# Patient Record
Sex: FEMALE | Race: OTHER | Employment: UNEMPLOYED | URBAN - METROPOLITAN AREA
[De-identification: names, ages, dates, MRNs, and addresses within clinical notes are randomized per-mention and may not be internally consistent; named-entity substitution may affect disease eponyms.]

---

## 2022-05-31 ENCOUNTER — OFFICE VISIT (OUTPATIENT)
Dept: PRIMARY CARE CLINIC | Age: 37
End: 2022-05-31

## 2022-05-31 VITALS
SYSTOLIC BLOOD PRESSURE: 102 MMHG | WEIGHT: 146 LBS | OXYGEN SATURATION: 97 % | TEMPERATURE: 97.8 F | HEART RATE: 72 BPM | DIASTOLIC BLOOD PRESSURE: 60 MMHG

## 2022-05-31 DIAGNOSIS — G51.0 BELL'S PALSY: Primary | ICD-10-CM

## 2022-05-31 DIAGNOSIS — Z76.89 ENCOUNTER TO ESTABLISH CARE: ICD-10-CM

## 2022-05-31 PROCEDURE — 99203 OFFICE O/P NEW LOW 30 MIN: CPT

## 2022-05-31 RX ORDER — VALACYCLOVIR HYDROCHLORIDE 1 G/1
1000 TABLET, FILM COATED ORAL 3 TIMES DAILY
Qty: 21 TABLET | Refills: 0 | Status: SHIPPED | OUTPATIENT
Start: 2022-05-31 | End: 2022-06-07

## 2022-05-31 RX ORDER — TETRAHYDROZOLINE HCL 0.05 %
1 DROPS OPHTHALMIC (EYE) 3 TIMES DAILY
Qty: 15 ML | Refills: 0 | Status: SHIPPED | OUTPATIENT
Start: 2022-05-31 | End: 2022-09-08

## 2022-05-31 RX ORDER — PREDNISONE 20 MG/1
60 TABLET ORAL DAILY
Qty: 21 TABLET | Refills: 0 | Status: SHIPPED | OUTPATIENT
Start: 2022-05-31 | End: 2022-06-07

## 2022-05-31 ASSESSMENT — ENCOUNTER SYMPTOMS
CHEST TIGHTNESS: 0
EYE PAIN: 1
WHEEZING: 0
BLOOD IN STOOL: 0
COUGH: 0
ABDOMINAL PAIN: 0
DIARRHEA: 0
CONSTIPATION: 0
VOMITING: 0
SHORTNESS OF BREATH: 0
NAUSEA: 0

## 2022-05-31 ASSESSMENT — PATIENT HEALTH QUESTIONNAIRE - PHQ9
2. FEELING DOWN, DEPRESSED OR HOPELESS: 1
SUM OF ALL RESPONSES TO PHQ QUESTIONS 1-9: 1
SUM OF ALL RESPONSES TO PHQ QUESTIONS 1-9: 1
1. LITTLE INTEREST OR PLEASURE IN DOING THINGS: 0
SUM OF ALL RESPONSES TO PHQ9 QUESTIONS 1 & 2: 1
SUM OF ALL RESPONSES TO PHQ QUESTIONS 1-9: 1
SUM OF ALL RESPONSES TO PHQ QUESTIONS 1-9: 1

## 2022-05-31 NOTE — ASSESSMENT & PLAN NOTE
Suspect Bell's Palsy. Rx glucocorticoid and antiviral x1 week. Eye drops TID and eye taping at night. F/u in 3 days if no improvement, ER if s/s worsen or develops new concerning s/s.

## 2022-05-31 NOTE — PATIENT INSTRUCTIONS
Patient Education        Parálisis facial de Harvey: Instrucciones de cuidado  Bell's Palsy: Care Instructions  Instrucciones de cuidado     La parálisis facial de Harvey es tasha parálisis o debilitamiento de los músculos de un lado de la chloe. Las personas con parálisis facial de Harvey suelen tener caído un lado de la boca y les evgeny trabajo cerrar por completo el taisha de adama mismo lado. La parálisis facial de Harvey puede interferir también con el sentido del gusto. Sleepy Eye sucede cuando se inflama un nervio de la chloe. La causa de la parálisis facial de Harvey no es un ataque cerebral. No se conoce la causa de esta inflamación del nervio. Penelope algunos expertos piensan que la causa podría ser un virus. Debido a esto, en ocasiones los médicos recetan un medicamento antiviral para tratarla. También podrían darle medicamentos para reducir lahinchazón. La parálisis facial de Harvey por lo general mejora por sí emiliana en algunassemanas o meses. La atención de seguimiento es tasha parte clave de gloria tratamiento y seguridad. Asegúrese de hacer y acudir a todas las citas, y llame a gloria médico si está teniendo problemas. También es tasha buena idea saber los [place] de susexámenes y mantener tasha lista de los medicamentos que christian. ¿Cómo puede cuidarse en el hogar?  [employer] medicamentos exactamente mich le fueron recetados. Llame a gloria médico si phu estar teniendo problemas con gloria medicamento. [employer] medicamentos específicos recetados por gloria médico.   Use lágrimas artificiales o pomada si se le secan demasiado los ojos. La parálisis facial de Harvey puede causar la caída del párpado inferior, lo que produce sequedad en el taisha.  Si no puede cerrar el taisha por completo, piense en usar un parche para dormir.  Ayúdese a parpadear usando un dedo para cerrar y abrir el párpado. Sleepy Eye podría ayudar a mantener el taisha húmedo.  Use anteojos o gafas para prevenir que el polvo y la chani entren en el taisha.    A medida que recupera la sensación en la chloe, masajee la frente, las mejillas y los labios. El masaje podría fortalecer los músculos de la chloe.  Cepíllese los dientes y use hilo dental con frecuencia para ayudar a prevenir las caries. La parálisis facial de Harvey puede secar la saliva en un lado de gloria boca. [place] aumenta el riesgo de caries. ¿Cuándo debe pedir ayuda? Llame al 911 en cualquier momento que considere que necesita atención de [place]. Por ejemplo, llame si:     Tiene síntomas de un ataque cerebral. Estos pueden incluir:  ? Entumecimiento, hormigueo, debilidad o parálisis repentinos en la chloe, el brazo o la pierna, sobre todo si ocurre en un solo lado del cuerpo. ? Cambios súbitos en la vista. ? Problemas repentinos para hablar. ? Confusión súbita o dificultad repentina para comprender frases sencillas. ? Problemas repentinos para caminar o mantener el equilibrio. ? Un dolor de dot intenso y repentino, distinto a los sacha de [place]. Llame a gloria médico ahora mismo o busque atención médica inmediata si:     Siente entumecimiento o debilidad que se expande más allá de un lado de la chloe.      Tiene un salpullido en la piel o dolor o enrojecimiento en el taisha, o le molesta la claude.      Tiene nuevo dolor de dot o [place]. Preste especial atención a los cambios en gloria chuyita y asegúrese de comunicarsecon gloria médico si:     No mejora mich se esperaba. ¿Dónde puede encontrar más información en inglés? Sarah Hart a [email or web address] e ingrese a gloria cuenta de MyChart. [place] P168 en el cuadro \"Search Health Information\" para más información (en inglés) sobre \"Parálisis facial de Harvey: Instrucciones de cuidado. \"     Si no tiene tasha cuenta, keshawn grover en el enlace \"Sign Up Now\". Revisado: 13 diciembre, 2021               Versión del contenido: 13.2  © 9435-9094 [employer]. Las instrucciones de cuidado fueron adaptadas bajo licencia por [facility] ([facility]). Si usted tiene [place] afección médica o sobre estas instrucciones, siempre pregunte a gloria profesional de chuyita. [facility] niega toda garantía o responsabilidad por gloria uso de esta información.

## 2022-05-31 NOTE — PROGRESS NOTES
Nydia Gray (:  1985) is a 39 y.o. female,New patient, here for evaluation of the following chief complaint(s):  Facial Pain (c/o facial pain right side of whole face. Mouth is also slanted since yesterday. Jenny Nunez #[id number])      HPI  Patient presents to establish care with new provider as well as for the following:  Right sided facial paralysis starting yesterday, patient states woke with changes. Patient says prior to this was feeling fine, she denies fever, N/V/D, or rash. Patient reports having bilateral eye pain along with changes to her vision, states she is having more difficulty seeing. She also reports pain to right side of her face. Patient denies N/T to extremities or weakness, she has no history of blood clots or afib, and denies use of OC and she is not a smoker. ASSESSMENT/PLAN:  1. Bell's palsy  Assessment & Plan:  Suspect Bell's Palsy. Rx glucocorticoid and antiviral x1 week. Eye drops TID and eye taping at night. F/u in 3 days if no improvement, ER if s/s worsen or develops new concerning s/s. Orders:  -     predniSONE (DELTASONE) 20 MG tablet; Take 3 tablets by mouth daily for 7 days, Disp-21 tablet, R-0Normal  -     valACYclovir (VALTREX) 1 g tablet; Take 1 tablet by mouth 3 times daily for 7 days, Disp-21 tablet, R-0Normal  -     tetrahydrozoline (EYE DROPS) 0.05 % ophthalmic solution; Place 1 drop into the right eye 3 times daily, Disp-15 mL, R-0Normal  2. Encounter to establish care  Assessment & Plan:   Annual labs ordered - will f/u with any abnormal results. Orders:  -     CBC with Auto Differential; Future  -     Hemoglobin A1C; Future  -     Lipid, Fasting; Future  -     Vitamin D 25 Hydroxy; Future  -     Comprehensive Metabolic Panel; Future  -     TSH with Reflex;  Future       /60   Pulse 72   Temp 97.8 °F (36.6 °C) (Infrared)   Wt 146 lb (66.2 kg)   SpO2 97%    VSS    SUBJECTIVE/OBJECTIVE:  Review of Systems   Constitutional: Negative for fatigue, fever and unexpected weight change. Eyes: Positive for pain (Pain to R side of face and behind eyes.) and visual disturbance (R eye unable to close completely, vision decreased. ). Respiratory: Negative for cough, chest tightness, shortness of breath and wheezing. Cardiovascular: Negative for chest pain, palpitations and leg swelling. Gastrointestinal: Negative for abdominal pain, blood in stool, constipation, diarrhea, nausea and vomiting. Neurological: Positive for facial asymmetry (R side facial paralysis) and numbness. Negative for dizziness, weakness, light-headedness and headaches. Physical Exam  Vitals and nursing note reviewed. Constitutional:       General: She is not in acute distress. Appearance: Normal appearance. She is not ill-appearing. HENT:      Head: Normocephalic. Eyes:      Extraocular Movements: Extraocular movements intact. Pupils: Pupils are equal, round, and reactive to light. Comments: right eyelid paralysis   Cardiovascular:      Rate and Rhythm: Normal rate and regular rhythm. Pulses: Normal pulses. Heart sounds: Normal heart sounds. Pulmonary:      Effort: Pulmonary effort is normal.      Breath sounds: Normal breath sounds. Musculoskeletal:         General: Normal range of motion. Cervical back: Normal range of motion. Skin:     General: Skin is warm and dry. Capillary Refill: Capillary refill takes less than 2 seconds. Neurological:      Mental Status: She is alert and oriented to person, place, and time. Mental status is at baseline. Motor: No weakness. Coordination: Coordination normal.      Gait: Gait normal.   Psychiatric:         Mood and Affect: Mood normal.         Behavior: Behavior normal.         Thought Content:  Thought content normal.         Judgment: Judgment normal.         Current Outpatient Medications   Medication Sig Dispense Refill    predniSONE (DELTASONE) 20 MG tablet Take 3 tablets by mouth daily for 7 days 21 tablet 0    valACYclovir (VALTREX) 1 g tablet Take 1 tablet by mouth 3 times daily for 7 days 21 tablet 0    tetrahydrozoline (EYE DROPS) 0.05 % ophthalmic solution Place 1 drop into the right eye 3 times daily 15 mL 0     No current facility-administered medications for this visit. Return in about 3 days (around 6/3/2022), or if symptoms worsen or fail to improve.     Electronically signed by AVELINO Garcia CNP on 5/31/2022 at 3:40 PM